# Patient Record
Sex: MALE | Race: WHITE | Employment: FULL TIME | URBAN - METROPOLITAN AREA
[De-identification: names, ages, dates, MRNs, and addresses within clinical notes are randomized per-mention and may not be internally consistent; named-entity substitution may affect disease eponyms.]

---

## 2017-11-22 PROCEDURE — 84153 ASSAY OF PSA TOTAL: CPT | Performed by: INTERNAL MEDICINE

## 2017-11-24 PROBLEM — H93.13 TINNITUS OF BOTH EARS: Status: ACTIVE | Noted: 2017-11-24

## 2018-03-05 PROBLEM — M76.31 ILIOTIBIAL BAND SYNDROME, RIGHT: Status: ACTIVE | Noted: 2018-03-05

## 2020-12-18 ENCOUNTER — IMMUNIZATION (OUTPATIENT)
Dept: LAB | Facility: HOSPITAL | Age: 55
End: 2020-12-18
Attending: PREVENTIVE MEDICINE

## 2020-12-18 DIAGNOSIS — Z23 NEED FOR VACCINATION: ICD-10-CM

## 2020-12-18 PROCEDURE — 0001A PFIZER-BIONTECH COVID-19 VACCINE: CPT

## 2021-01-08 ENCOUNTER — IMMUNIZATION (OUTPATIENT)
Dept: LAB | Facility: HOSPITAL | Age: 56
End: 2021-01-08
Attending: PREVENTIVE MEDICINE
Payer: COMMERCIAL

## 2021-01-08 DIAGNOSIS — Z23 NEED FOR VACCINATION: ICD-10-CM

## 2021-01-08 PROCEDURE — 0002A SARSCOV2 VAC 30MCG/0.3ML IM: CPT

## 2021-10-01 ENCOUNTER — IMMUNIZATION (OUTPATIENT)
Dept: LAB | Facility: HOSPITAL | Age: 56
End: 2021-10-01
Attending: EMERGENCY MEDICINE
Payer: COMMERCIAL

## 2021-10-01 DIAGNOSIS — Z23 NEED FOR VACCINATION: Primary | ICD-10-CM

## 2021-10-01 PROCEDURE — 0003A SARSCOV2 VAC 30MCG/0.3ML IM: CPT

## 2024-04-16 RX ORDER — MONTELUKAST SODIUM 10 MG/1
10 TABLET ORAL DAILY
Qty: 90 TABLET | Refills: 3 | OUTPATIENT
Start: 2024-04-16

## 2024-04-30 ENCOUNTER — HOSPITAL ENCOUNTER (EMERGENCY)
Facility: HOSPITAL | Age: 59
Discharge: HOME | End: 2024-04-30
Attending: EMERGENCY MEDICINE
Payer: COMMERCIAL

## 2024-04-30 VITALS
HEIGHT: 68 IN | DIASTOLIC BLOOD PRESSURE: 94 MMHG | BODY MASS INDEX: 26.07 KG/M2 | SYSTOLIC BLOOD PRESSURE: 143 MMHG | RESPIRATION RATE: 16 BRPM | TEMPERATURE: 98 F | OXYGEN SATURATION: 96 % | HEART RATE: 68 BPM | WEIGHT: 172 LBS

## 2024-04-30 DIAGNOSIS — H61.22 IMPACTED CERUMEN OF LEFT EAR: Primary | ICD-10-CM

## 2024-04-30 PROCEDURE — 99282 EMERGENCY DEPT VISIT SF MDM: CPT

## 2024-04-30 NOTE — ED INITIAL ASSESSMENT (HPI)
Patient to ED for left ear pain since yesterday evening. States he has a hx of ear wax plugs and thinks this is a reoccurrence. Denies fevers.

## 2024-04-30 NOTE — ED PROVIDER NOTES
Patient Seen in: [facility] Emergency Department      History     Chief Complaint   Patient presents with    Ear Problem Pain     Stated Complaint: ear problem    Subjective:   HPI        Objective:   Past Medical History:    Tinnitus of both ears              Past Surgical History:   Procedure Laterality Date    Colonoscopy,biopsy  2/18/2011    Performed by HARRY PINEDO at [facility] [place], [facility]    Hernia surgery      Knee arthroscopy Right     ACL repair                Social History     Socioeconomic History    Marital status:    Tobacco Use    Smoking status: Never    Smokeless tobacco: Never   Vaping Use    Vaping status: Never Used   Substance and Sexual Activity    Alcohol use: No    Drug use: No    Sexual activity: Yes     Partners: Female     Social Determinants of Health     Housing Stability: Low Risk  (1/18/2024)    [facility]    Housing Stability Vital Sign     Unable to Pay for Housing in the Last Year: No     Number of Places Lived in the Last Year: 1     In the last 12 months, was there a time when you did not have a steady place to sleep or slept in a shelter (including now)?: No              Review of Systems    Positive for stated complaint: ear problem  Other systems are as noted in HPI.  Constitutional and vital signs reviewed.      All other systems reviewed and negative except as noted above.    Physical Exam     ED Triage Vitals [04/30/24 0752]   BP (!) 143/94   Pulse 68   Resp 16   Temp 97.8 °F (36.6 °C)   Temp src Oral   SpO2 96 %   O2 Device None (Room air)       Current:BP (!) 143/94   Pulse 68   Temp 97.8 °F (36.6 °C) (Oral)   Resp 16   Ht 172.7 cm (5' 8\")   Wt 78 kg   SpO2 96%   BMI 26.15 kg/m²         Physical Exam          ED Course   Labs Reviewed - No data to display                   MDM      59-year-old male with a history of previous episodes of cerumen impaction presents today with some  discomfort of the left ear since last night.  Symptoms similar to previous episodes of cerumen impaction.    On exam, well-appearing, after left ear irrigation, mild erythema but no cerumen impaction and clear tympanic membrane    Differential: Left-sided cerumen impaction    The patient was irrigated with warm water which did remove a large piece of cerumen after which the patient felt better.    Patient prescribed Debrox for symptom recurrence and given information for ENT follow-up if needed.  Discharged in stable condition.                               MDM    Disposition and Plan     Clinical Impression:  1. Impacted cerumen of left ear         Disposition:  Discharge  4/30/2024  8:48 am    Follow-up:  Jesús Slade MD  [address]  [place] [id number] [phone]    Follow up      Colt Cerrato MD  [address]  [facility] [address]  [phone]    Follow up  As needed          Medications Prescribed:  Discharge Medication List as of 4/30/2024  8:51 AM        START taking these medications    Details   carbamide peroxide (DEBROX) 6.5 % Otic Solution Place 5 drops into both ears 2 (two) times daily for 5 days., Normal, Disp-15 mL, R-0